# Patient Record
Sex: FEMALE | Race: OTHER | NOT HISPANIC OR LATINO | Employment: FULL TIME | URBAN - METROPOLITAN AREA
[De-identification: names, ages, dates, MRNs, and addresses within clinical notes are randomized per-mention and may not be internally consistent; named-entity substitution may affect disease eponyms.]

---

## 2023-11-29 ENCOUNTER — OFFICE VISIT (OUTPATIENT)
Dept: OPHTHALMOLOGY | Facility: CLINIC | Age: 73
End: 2023-11-29
Payer: MEDICARE

## 2023-11-29 DIAGNOSIS — Q15.9: ICD-10-CM

## 2023-11-29 DIAGNOSIS — H40.52X1: ICD-10-CM

## 2023-11-29 DIAGNOSIS — H40.2213 CHRONIC ANGLE-CLOSURE GLAUCOMA OF RIGHT EYE, SEVERE STAGE: Primary | ICD-10-CM

## 2023-11-29 DIAGNOSIS — H25.811 COMBINED FORMS OF AGE-RELATED CATARACT OF RIGHT EYE: ICD-10-CM

## 2023-11-29 DIAGNOSIS — Z96.1 PSEUDOPHAKIA: ICD-10-CM

## 2023-11-29 PROCEDURE — 92012 INTRM OPH EXAM EST PATIENT: CPT | Performed by: OPHTHALMOLOGY

## 2023-11-29 ASSESSMENT — EXTERNAL EXAM - LEFT EYE: OS_EXAM: NORMAL

## 2023-11-29 ASSESSMENT — EXTERNAL EXAM - RIGHT EYE: OD_EXAM: NORMAL

## 2023-11-29 ASSESSMENT — TONOMETRY
OS_IOP_MMHG: 18
IOP_METHOD: GOLDMANN APPLANATION
OD_IOP_MMHG: 23

## 2023-11-29 ASSESSMENT — SLIT LAMP EXAM - LIDS
COMMENTS: NORMAL
COMMENTS: NORMAL

## 2023-11-29 ASSESSMENT — VISUAL ACUITY
OD_CC: NLP
METHOD: SNELLEN - LINEAR
OS_CC: 20/25-

## 2023-11-29 NOTE — PROGRESS NOTES
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

## 2024-02-28 ENCOUNTER — APPOINTMENT (OUTPATIENT)
Dept: OPHTHALMOLOGY | Facility: CLINIC | Age: 74
End: 2024-02-28
Payer: MEDICARE

## 2024-04-10 ENCOUNTER — OFFICE VISIT (OUTPATIENT)
Dept: OPHTHALMOLOGY | Facility: CLINIC | Age: 74
End: 2024-04-10
Payer: MEDICARE

## 2024-04-10 DIAGNOSIS — Q15.9: ICD-10-CM

## 2024-04-10 DIAGNOSIS — H25.811 COMBINED FORMS OF AGE-RELATED CATARACT OF RIGHT EYE: ICD-10-CM

## 2024-04-10 DIAGNOSIS — H40.2213 CHRONIC ANGLE-CLOSURE GLAUCOMA OF RIGHT EYE, SEVERE STAGE: Primary | ICD-10-CM

## 2024-04-10 DIAGNOSIS — H40.52X1: ICD-10-CM

## 2024-04-10 DIAGNOSIS — H04.123 DRY EYES: ICD-10-CM

## 2024-04-10 DIAGNOSIS — Z96.1 PSEUDOPHAKIA: ICD-10-CM

## 2024-04-10 ASSESSMENT — ENCOUNTER SYMPTOMS
PSYCHIATRIC NEGATIVE: 0
ENDOCRINE NEGATIVE: 0
HEMATOLOGIC/LYMPHATIC NEGATIVE: 0
MUSCULOSKELETAL NEGATIVE: 0
CONSTITUTIONAL NEGATIVE: 0
NEUROLOGICAL NEGATIVE: 0
EYES NEGATIVE: 1
ALLERGIC/IMMUNOLOGIC NEGATIVE: 0
CARDIOVASCULAR NEGATIVE: 0
GASTROINTESTINAL NEGATIVE: 0
RESPIRATORY NEGATIVE: 0

## 2024-04-10 ASSESSMENT — VISUAL ACUITY
OS_CC+: -2
METHOD: SNELLEN - LINEAR
OS_CC: 20/20
OD_CC: NLP

## 2024-04-10 ASSESSMENT — TONOMETRY
IOP_METHOD: GOLDMANN APPLANATION
OS_IOP_MMHG: 19
OD_IOP_MMHG: 21

## 2024-04-10 ASSESSMENT — PACHYMETRY
OD_CT(UM): 509
OS_CT(UM): 520

## 2024-04-10 ASSESSMENT — SLIT LAMP EXAM - LIDS
COMMENTS: NORMAL
COMMENTS: NORMAL

## 2024-04-10 ASSESSMENT — EXTERNAL EXAM - LEFT EYE: OS_EXAM: NORMAL

## 2024-04-10 ASSESSMENT — EXTERNAL EXAM - RIGHT EYE: OD_EXAM: NORMAL

## 2024-04-10 NOTE — PROGRESS NOTES
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

## 2024-08-07 ENCOUNTER — APPOINTMENT (OUTPATIENT)
Dept: OPHTHALMOLOGY | Facility: CLINIC | Age: 74
End: 2024-08-07
Payer: MEDICARE

## 2024-10-21 ENCOUNTER — APPOINTMENT (OUTPATIENT)
Dept: OPHTHALMOLOGY | Age: 74
End: 2024-10-21
Payer: MEDICARE

## 2024-10-21 DIAGNOSIS — H40.2213 CHRONIC ANGLE-CLOSURE GLAUCOMA OF RIGHT EYE, SEVERE STAGE: Primary | ICD-10-CM

## 2024-10-21 DIAGNOSIS — H40.52X1: ICD-10-CM

## 2024-10-21 DIAGNOSIS — Q15.9: ICD-10-CM

## 2024-10-21 PROCEDURE — 99214 OFFICE O/P EST MOD 30 MIN: CPT | Performed by: OPHTHALMOLOGY

## 2024-10-21 PROCEDURE — 92083 EXTENDED VISUAL FIELD XM: CPT | Mod: LEFT SIDE | Performed by: OPHTHALMOLOGY

## 2024-10-21 PROCEDURE — 92134 CPTRZ OPH DX IMG PST SGM RTA: CPT | Mod: LEFT SIDE | Performed by: OPHTHALMOLOGY

## 2024-10-21 PROCEDURE — 92020 GONIOSCOPY: CPT | Performed by: OPHTHALMOLOGY

## 2024-10-21 RX ORDER — ATORVASTATIN CALCIUM 20 MG/1
TABLET, FILM COATED ORAL
COMMUNITY
Start: 2024-07-10

## 2024-10-21 RX ORDER — METHIMAZOLE 5 MG/1
TABLET ORAL
COMMUNITY
Start: 2024-07-31

## 2024-10-21 RX ORDER — LISINOPRIL 10 MG/1
TABLET ORAL
COMMUNITY
Start: 2024-08-17

## 2024-10-21 RX ORDER — FLUTICASONE PROPIONATE AND SALMETEROL 113; 14 UG/1; UG/1
POWDER, METERED RESPIRATORY (INHALATION)
COMMUNITY
Start: 2024-10-10

## 2024-10-21 RX ORDER — AMLODIPINE BESYLATE 5 MG/1
TABLET ORAL
COMMUNITY
Start: 2024-08-06

## 2024-10-21 ASSESSMENT — VISUAL ACUITY
OS_CC: 20/20
CORRECTION_TYPE: GLASSES
OS_CC+: -1
METHOD: SNELLEN - LINEAR
OD_CC: NLP

## 2024-10-21 ASSESSMENT — ENCOUNTER SYMPTOMS
EYES NEGATIVE: 1
PSYCHIATRIC NEGATIVE: 0
NEUROLOGICAL NEGATIVE: 0
HEMATOLOGIC/LYMPHATIC NEGATIVE: 0
RESPIRATORY NEGATIVE: 0
ENDOCRINE NEGATIVE: 0
ALLERGIC/IMMUNOLOGIC NEGATIVE: 0
GASTROINTESTINAL NEGATIVE: 0
CONSTITUTIONAL NEGATIVE: 0
MUSCULOSKELETAL NEGATIVE: 0
CARDIOVASCULAR NEGATIVE: 0

## 2024-10-21 ASSESSMENT — TONOMETRY
OS_IOP_MMHG: 18
OS_IOP_MMHG: 16
IOP_METHOD: GOLDMANN APPLANATION
OD_IOP_MMHG: 21
IOP_METHOD: TONOPEN

## 2024-10-21 ASSESSMENT — REFRACTION_WEARINGRX
OS_ADD: +3.00
OS_AXIS: 089
OD_SPHERE: PLANO
OS_CYLINDER: -2.25
OD_CYLINDER: SPHERE
OS_SPHERE: +2.50

## 2024-10-21 ASSESSMENT — GONIOSCOPY
OS_NASAL: PTM
OS_SUPERIOR: ATM
OS_TEMPORAL: PTM
OS_INFERIOR: PTM

## 2024-10-21 ASSESSMENT — PACHYMETRY
OD_CT(UM): 509
OS_CT(UM): 520

## 2024-10-21 ASSESSMENT — SLIT LAMP EXAM - LIDS
COMMENTS: NORMAL
COMMENTS: NORMAL

## 2024-10-21 ASSESSMENT — CUP TO DISC RATIO: OS_RATIO: 0.3

## 2024-10-21 ASSESSMENT — EXTERNAL EXAM - LEFT EYE: OS_EXAM: NORMAL

## 2024-10-21 ASSESSMENT — EXTERNAL EXAM - RIGHT EYE: OD_EXAM: NORMAL

## 2024-10-21 NOTE — PROGRESS NOTES
10/21/2024 CC: 74 y.o. presents for glaucoma FU, Dr Melendrez    Past ocular history: CACG, Pseudophakia OS, CECI  Family history: no family history of glaucoma   Past medical history: HTN, Thyroid  Social history: denies tobacco     Eye medications:   Both eyes: lumigan qhs, combigan bid    Allergy:  NKDA    Testing:    HVF 24-2 2/9/2023: OS only- FL, inf scatters, MD -0.69 dB  HVF 24-2 10/21/2024: OS only- inf scatters, MD -1.5 dB-stable    OCT 8/24/2023: OS only- bdl ST/IT, avg 81 um  OCT 10/21/2024: OS only- bdl IT, avg 83 um-stable    Pachymetry 10/21/2024: 509/520    Gonioscopy 10/21/2024: OS- open, scattered PAS.    Tmax: 33/22    Assessment:   CACG OU, absolute OD, mild OS  - Negative fhx  - thinner cornea  - LPI OU, patent  - Testing OS: stable  - IOP today 21/16    Pseudophakia OS    NS cataract OD    RE  - Hyperopia, ast.    MING  - Hx of Grave's     Plan:   I explained my findings. CACG, IOP acceptable.    Eye medications:   Both eyes: Continue lumigan qhs, combigan bid    Return in 6 mo, update testing

## 2025-04-20 NOTE — PROGRESS NOTES
4/21/2025 CC: 74 y.o. presents for  6 mo glaucoma FU, w/ testing.    Past ocular history: CACG, Pseudophakia OS, CECI  Family history: no family history of glaucoma   Past medical history: HTN, Thyroid  Social history: denies tobacco     Eye medications:   Both eyes: lumigan qhs, combigan bid    Allergy:  NKDA    Testing:     HVF 24-2 4/21/2025: OS only- inf scatters, MD -3.3 dB-stable  HVF 24-2 10/21/2024: OS only- inf scatters, MD -1.5 dB-stable   HVF 24-2 2/9/2023: OS only- FL, inf scatters, MD -0.69 dB    OCT 4/21/2025: OS only-artifacts, bdl IT, avg 73 um. GCA: full. Mac.: Regular macular contour, . stable  OCT 10/21/2024: OS only- bdl IT, avg 83 um-stable  OCT 8/24/2023: OS only- bdl ST/IT, avg 81 um    Pachymetry 10/21/2024: 509/520    Gonioscopy 10/21/2024: OS- open, scattered PAS.    Tmax: 33/22    Assessment:   CACG OU, absolute OD, mild OS  - Negative fhx  - thinner cornea  - LPI OU, patent  - Testing OS: stable  IOP target: Comfort/mid-teens  - IOP 10/21/2024: 21/16. CPM.  - 4/21/2025: 21/14. Stable exam/testing. CPM.    Pseudophakia OS  - s/p PCIOL    NS cataract OD    RE  - Hyperopia, ast.    MING  - Hx of Grave's     Plan:   I explained my findings. CACG, IOP acceptable.    Eye medications:   Both eyes: Continue lumigan qhs, combigan bid    Return in 6 mo, OCT/DFE

## 2025-04-21 ENCOUNTER — APPOINTMENT (OUTPATIENT)
Dept: OPHTHALMOLOGY | Age: 75
End: 2025-04-21
Payer: MEDICARE

## 2025-04-21 DIAGNOSIS — H40.2213 CHRONIC ANGLE-CLOSURE GLAUCOMA OF RIGHT EYE, SEVERE STAGE: Primary | ICD-10-CM

## 2025-04-21 PROCEDURE — 92133 CPTRZD OPH DX IMG PST SGM ON: CPT | Mod: LEFT SIDE | Performed by: OPHTHALMOLOGY

## 2025-04-21 PROCEDURE — 92083 EXTENDED VISUAL FIELD XM: CPT | Mod: LEFT SIDE | Performed by: OPHTHALMOLOGY

## 2025-04-21 PROCEDURE — 99214 OFFICE O/P EST MOD 30 MIN: CPT | Performed by: OPHTHALMOLOGY

## 2025-04-21 ASSESSMENT — VISUAL ACUITY
OD_CC: NLP
OS_CC: 20/20
OS_CC+: -2
METHOD: SNELLEN - LINEAR

## 2025-04-21 ASSESSMENT — ENCOUNTER SYMPTOMS
EYES NEGATIVE: 1
ENDOCRINE NEGATIVE: 0
CARDIOVASCULAR NEGATIVE: 0
NEUROLOGICAL NEGATIVE: 0
RESPIRATORY NEGATIVE: 0
ALLERGIC/IMMUNOLOGIC NEGATIVE: 0
HEMATOLOGIC/LYMPHATIC NEGATIVE: 0
PSYCHIATRIC NEGATIVE: 0
CONSTITUTIONAL NEGATIVE: 0
GASTROINTESTINAL NEGATIVE: 0
MUSCULOSKELETAL NEGATIVE: 0

## 2025-04-21 ASSESSMENT — TONOMETRY
OS_IOP_MMHG: 14
IOP_METHOD: TONOPEN
OD_IOP_MMHG: 21

## 2025-04-21 ASSESSMENT — EXTERNAL EXAM - LEFT EYE: OS_EXAM: NORMAL

## 2025-04-21 ASSESSMENT — PACHYMETRY
OS_CT(UM): 520
OD_CT(UM): 509

## 2025-04-21 ASSESSMENT — SLIT LAMP EXAM - LIDS
COMMENTS: NORMAL
COMMENTS: NORMAL

## 2025-04-21 ASSESSMENT — CUP TO DISC RATIO: OS_RATIO: 0.3

## 2025-04-21 ASSESSMENT — EXTERNAL EXAM - RIGHT EYE: OD_EXAM: NORMAL

## 2025-10-30 ENCOUNTER — APPOINTMENT (OUTPATIENT)
Dept: OPHTHALMOLOGY | Facility: CLINIC | Age: 75
End: 2025-10-30
Payer: MEDICARE